# Patient Record
Sex: FEMALE | Race: WHITE | NOT HISPANIC OR LATINO | Employment: OTHER | URBAN - METROPOLITAN AREA
[De-identification: names, ages, dates, MRNs, and addresses within clinical notes are randomized per-mention and may not be internally consistent; named-entity substitution may affect disease eponyms.]

---

## 2020-12-22 ENCOUNTER — OFFICE VISIT (OUTPATIENT)
Dept: FAMILY MEDICINE | Facility: CLINIC | Age: 55
End: 2020-12-22
Payer: MEDICAID

## 2020-12-22 VITALS
OXYGEN SATURATION: 98 % | BODY MASS INDEX: 26.22 KG/M2 | DIASTOLIC BLOOD PRESSURE: 90 MMHG | TEMPERATURE: 99 F | WEIGHT: 148 LBS | SYSTOLIC BLOOD PRESSURE: 137 MMHG | RESPIRATION RATE: 16 BRPM | HEART RATE: 90 BPM | HEIGHT: 63 IN

## 2020-12-22 DIAGNOSIS — J44.9 CHRONIC OBSTRUCTIVE PULMONARY DISEASE, UNSPECIFIED COPD TYPE: ICD-10-CM

## 2020-12-22 DIAGNOSIS — I69.398 DEPRESSION DUE TO OLD STROKE: ICD-10-CM

## 2020-12-22 DIAGNOSIS — L50.8 CHRONIC URTICARIA: ICD-10-CM

## 2020-12-22 DIAGNOSIS — F06.31 DEPRESSION DUE TO OLD STROKE: ICD-10-CM

## 2020-12-22 DIAGNOSIS — Z71.6 ENCOUNTER FOR SMOKING CESSATION COUNSELING: ICD-10-CM

## 2020-12-22 DIAGNOSIS — I63.9 ISCHEMIC STROKE: Primary | ICD-10-CM

## 2020-12-22 DIAGNOSIS — R53.81 PHYSICAL DEBILITY: ICD-10-CM

## 2020-12-22 PROCEDURE — 99205 PR OFFICE/OUTPT VISIT, NEW, LEVL V, 60-74 MIN: ICD-10-PCS | Mod: S$GLB,,, | Performed by: NURSE PRACTITIONER

## 2020-12-22 PROCEDURE — 99205 OFFICE O/P NEW HI 60 MIN: CPT | Mod: S$GLB,,, | Performed by: NURSE PRACTITIONER

## 2020-12-22 RX ORDER — HYDROXYZINE HYDROCHLORIDE 50 MG/1
50 TABLET, FILM COATED ORAL 3 TIMES DAILY
COMMUNITY
End: 2020-12-22 | Stop reason: SDUPTHER

## 2020-12-22 RX ORDER — FAMOTIDINE 20 MG/1
20 TABLET, FILM COATED ORAL 2 TIMES DAILY
COMMUNITY
End: 2020-12-22 | Stop reason: SDUPTHER

## 2020-12-22 RX ORDER — HYDROXYZINE HYDROCHLORIDE 50 MG/1
50 TABLET, FILM COATED ORAL 3 TIMES DAILY PRN
Qty: 90 TABLET | Refills: 5 | Status: SHIPPED | OUTPATIENT
Start: 2020-12-22 | End: 2021-09-27 | Stop reason: SDUPTHER

## 2020-12-22 RX ORDER — ASPIRIN 81 MG/1
81 TABLET ORAL DAILY
COMMUNITY
End: 2022-05-18

## 2020-12-22 RX ORDER — ASPIRIN 300 MG/1
SUPPOSITORY RECTAL
COMMUNITY
End: 2022-05-18

## 2020-12-22 RX ORDER — ALBUTEROL SULFATE 90 UG/1
2 AEROSOL, METERED RESPIRATORY (INHALATION) EVERY 6 HOURS PRN
Qty: 18 G | Refills: 0 | Status: SHIPPED | OUTPATIENT
Start: 2020-12-22 | End: 2021-03-10 | Stop reason: SDUPTHER

## 2020-12-22 RX ORDER — BUPROPION HYDROCHLORIDE 150 MG/1
150 TABLET ORAL DAILY
Qty: 30 TABLET | Refills: 11 | Status: SHIPPED | OUTPATIENT
Start: 2020-12-22 | End: 2022-06-10 | Stop reason: DRUGHIGH

## 2020-12-22 RX ORDER — FAMOTIDINE 20 MG/1
20 TABLET, FILM COATED ORAL 2 TIMES DAILY
Qty: 60 TABLET | Refills: 3 | Status: SHIPPED | OUTPATIENT
Start: 2020-12-22 | End: 2021-11-12 | Stop reason: SDUPTHER

## 2020-12-22 RX ORDER — CLOPIDOGREL BISULFATE 75 MG/1
75 TABLET ORAL EVERY MORNING
COMMUNITY
Start: 2020-05-01

## 2020-12-22 RX ORDER — HYDROXYZINE 50 MG/ML
INJECTION, SOLUTION INTRAMUSCULAR
COMMUNITY
Start: 2020-06-01 | End: 2024-01-31

## 2020-12-22 NOTE — ASSESSMENT & PLAN NOTE
Will issue rescue inhaler.  She will eventually need pulmonary function studies.  Plan to do this at a later visit. For now smoking cessation.

## 2020-12-22 NOTE — ASSESSMENT & PLAN NOTE
Start Wellbutrin. Discussed side effects of meds.     Will trial. She will RTC in 2 weeks or sooner if symptoms worsen of AEs develop.

## 2020-12-22 NOTE — ASSESSMENT & PLAN NOTE
Currently on Plavix daily and ASA.     She has refused STATINs in the past. Advised CoQ10 OTC if she is unwilling to do statins. She states that statins caused one of her strokes post DC.... doubtful..       Will send to Neuro and PT.       She voiced understanding of risk mods.  We will start smoking cessation.     Advised BP reduction w/ lisinopril. She Refused BP med.

## 2020-12-22 NOTE — PROGRESS NOTES
"Subjective:      Patient ID: Amira Kruse is a 55 y.o. female.    Chief Complaint: Establish Care (Recently moved from [place]. )      55-year-old female       Current smoker      Past medical history of ischemic stroke, COPD, chronic urticaria, GERD, anxiety.       Here today to establish care.         Recently discharged from the hospital in [place] with ischemic stroke.  Patient states that she had a new extensive cardiovascular workup which was ultimately negative for cause.  She was told her stroke was due to her smoking.  She is not stop smoking this point.  She is requesting smoking cessation.  Smokes 6 cigarettes per day. RU and RLE limited 2/t CVA. + weakness. Walks with a cane. Fine motor function limited in the right side. Speech is affected as well. + slurring at times. Has issues with memory as well.  She was unable to do Physical/occupational therapy 2/t insurance. She is currently on Plavix and ASA.  She is not on blood pressure medication or statins stating that a statin caused her CVA after she was discharged.  She is requesting Neurology and PT in the area. She is able to ambulate with assistance of cane. No recent falls. She is left handed. She reports "occular ischemic strokes" x 6 with 8 total strokes.       Also noted to have depression as a result of her stroke. Anxiety controlled w/ TID attarax. Denies SI, HI, delusions, grandiosity, hallucinations.       No other issues reported at this time.     Past Medical History:   Diagnosis Date    Anxiety disorder     GERD (gastroesophageal reflux disease)     Hive     Ischemic stroke     x 8 total; 6 occular      Social History     Socioeconomic History    Marital status: Single     Spouse name: Not on file    Number of children: Not on file    Years of education: Not on file    Highest education level: Not on file   Occupational History    Not on file   Social Needs    Financial resource strain: Not on file    Food insecurity "     Worry: Not on file     Inability: Not on file    Transportation needs     Medical: Not on file     Non-medical: Not on file   Tobacco Use    Smoking status: Current Every Day Smoker     Packs/day: 0.50     Years: 40.00     Pack years: 20.00     Types: Cigarettes    Smokeless tobacco: Never Used   Substance and Sexual Activity    Alcohol use: Not Currently    Drug use: Never    Sexual activity: Not on file   Lifestyle    Physical activity     Days per week: Not on file     Minutes per session: Not on file    Stress: Not on file   Relationships    Social connections     Talks on phone: Not on file     Gets together: Not on file     Attends Yazidi service: Not on file     Active member of club or organization: Not on file     Attends meetings of clubs or organizations: Not on file     Relationship status: Not on file   Other Topics Concern    Not on file   Social History Narrative    Not on file      Family History   Problem Relation Age of Onset    Stroke Mother     Heart attack Father         triple bypass    Stroke Maternal Grandfather         ROS:   Review of Systems   Constitutional: Negative for appetite change, chills, diaphoresis and fever.   HENT: Negative for congestion, drooling, facial swelling, mouth sores, postnasal drip, rhinorrhea, sinus pain, sneezing, sore throat, tinnitus, trouble swallowing and voice change.    Eyes: Negative for photophobia and visual disturbance.   Respiratory: Negative for apnea, cough, choking, shortness of breath and wheezing.    Cardiovascular: Negative for chest pain, palpitations and leg swelling.   Gastrointestinal: Negative for abdominal pain, blood in stool, diarrhea, nausea and vomiting.   Endocrine: Negative for polydipsia, polyphagia and polyuria.   Genitourinary: Negative for difficulty urinating, dysuria, flank pain, frequency, hematuria and urgency.   Musculoskeletal: Negative for joint swelling and neck stiffness.   Skin: Negative for rash.    Neurological: Positive for speech difficulty and weakness (RUE, RLE). Negative for dizziness, tremors, seizures, syncope, facial asymmetry, light-headedness and numbness.   Hematological: Negative for adenopathy. Does not bruise/bleed easily.   Psychiatric/Behavioral: Positive for dysphoric mood. Negative for agitation, behavioral problems, confusion, decreased concentration, hallucinations, self-injury and suicidal ideas. The patient is nervous/anxious. The patient is not hyperactive.    All other systems reviewed and are negative.    Objective:   Physical Exam  Vitals signs and nursing note reviewed.   Constitutional:       General: She is awake. She is not in acute distress.     Appearance: Normal appearance. She is well-developed and overweight. She is not ill-appearing.   HENT:      Head: Normocephalic and atraumatic.      Nose: Nose normal.      Mouth/Throat:      Mouth: Mucous membranes are moist.   Eyes:      General: No visual field deficit or scleral icterus.     Pupils: Pupils are equal, round, and reactive to light.   Neck:      Musculoskeletal: Normal range of motion and neck supple.      Vascular: No carotid bruit.   Cardiovascular:      Rate and Rhythm: Normal rate and regular rhythm.      Pulses: Normal pulses.      Heart sounds: Normal heart sounds. No murmur.   Pulmonary:      Effort: Pulmonary effort is normal.      Breath sounds: Wheezing present.   Abdominal:      General: Bowel sounds are normal.      Palpations: Abdomen is soft.   Musculoskeletal: Normal range of motion.      Right lower leg: No edema.      Left lower leg: No edema.   Lymphadenopathy:      Cervical: No cervical adenopathy.   Skin:     General: Skin is warm and dry.      Capillary Refill: Capillary refill takes less than 2 seconds.      Coloration: Skin is not jaundiced.      Findings: No rash.   Neurological:      Mental Status: She is alert and oriented to person, place, and time.      Cranial Nerves: Dysarthria present. No  facial asymmetry.      Sensory: Sensation is intact.      Motor: Weakness (RUE, RLE) present. No tremor, abnormal muscle tone or seizure activity.      Coordination: Coordination abnormal.   Psychiatric:         Behavior: Behavior normal. Behavior is cooperative.         Thought Content: Thought content normal.         Judgment: Judgment normal.       Assessment:     1. Ischemic stroke    2. Physical debility    3. Encounter for smoking cessation counseling    4. Chronic urticaria    5. Depression due to old stroke    6. Chronic obstructive pulmonary disease, unspecified COPD type      No images are attached to the encounter.   Plan:     Problem List Items Addressed This Visit        Neuro    Ischemic stroke - Primary    Overview     x 8 total; 6 occular         Current Assessment & Plan     Currently on Plavix daily and ASA.     She has refused STATINs in the past. Advised CoQ10 OTC if she is unwilling to do statins. She states that statins caused one of her strokes post DC.... doubtful..       Will send to Neuro and PT.       She voiced understanding of risk mods.  We will start smoking cessation.     Advised BP reduction w/ lisinopril. She Refused BP med.          Relevant Orders    Ambulatory referral/consult to Neurology    Ambulatory referral/consult to Physical/Occupational Therapy    Depression due to old stroke    Current Assessment & Plan     Start Wellbutrin. Discussed side effects of meds.     Will trial. She will RTC in 2 weeks or sooner if symptoms worsen of AEs develop.          Relevant Medications    buPROPion (WELLBUTRIN XL) 150 MG TB24 tablet    hydrOXYzine (ATARAX) 50 MG tablet       Derm    Chronic urticaria    Relevant Medications    famotidine (PEPCID) 20 MG tablet    hydrOXYzine (ATARAX) 50 MG tablet       Pulmonary    Chronic obstructive pulmonary disease    Current Assessment & Plan     Will issue rescue inhaler.  She will eventually need pulmonary function studies.  Plan to do this at a  later visit. For now smoking cessation.          Relevant Medications    albuterol (PROAIR HFA) 90 mcg/actuation inhaler       Other    Physical debility    Current Assessment & Plan     Refer to PT for physical therapy.          Relevant Orders    Ambulatory referral/consult to Physical/Occupational Therapy      Other Visit Diagnoses     Encounter for smoking cessation counseling        Discussed AEs r/t Wellbutrin vs chantix vs gum/patch. She wishes to do Wellbutrin    Relevant Medications    buPROPion (WELLBUTRIN XL) 150 MG TB24 tablet        Medical records request sent to [place].  Patient requests to defer labs stating she recently had them done.       All diagnostic data (labs/imaging) was reviewed with the patient and/or family member in the room.  All questions were answered to their liking. The patient and/or family member voiced understanding of all instructions provided. Expectations regarding follow up and treatment plan were voiced and confirmed prior to departure. The patient was given orders/instructions at the end of the visit for reference.     Follow up:     There are no Patient Instructions on file for this visit.     Follow up in about 2 weeks (around 1/5/2021), or if symptoms worsen or fail to improve.

## 2020-12-28 ENCOUNTER — TELEPHONE (OUTPATIENT)
Dept: FAMILY MEDICINE | Facility: CLINIC | Age: 55
End: 2020-12-28

## 2020-12-28 NOTE — TELEPHONE ENCOUNTER
I have tried to help her prevent recurrence. I advised blood pressure control which she refused to take. I advised all preventative measures. IF she would like to come in then ok. Otherwise she will need to see Neuro. If it happens again she needs to go to ER for further evaluation. We do not have her records so I can not weigh in on it more than I have in the prior encounter.  We thoroughly discussed the risks associated with not following recommendations. Im fine with whatever she wishes to pursue.

## 2020-12-28 NOTE — TELEPHONE ENCOUNTER
----- Message from Anyi Alan sent at 12/28/2020  9:52 AM CST -----  Patient need to speak to nurse regarding feeling disoriented and words not making any sense.  Call back number [phone]. Tks

## 2020-12-28 NOTE — TELEPHONE ENCOUNTER
That's not in our deal and im not going to agree to her not seeing a Neurologist. She can come in tomorrow and I will have this convo in person.

## 2020-12-28 NOTE — TELEPHONE ENCOUNTER
Pt will come in tomorrow. Also no local neurologists takes [employer]. I advised her to find an in [facility] neuro, but she now wants to just start Bp rx.

## 2020-12-28 NOTE — TELEPHONE ENCOUNTER
States one side of face was hanging and she was talking out of her head on Miguel Ángel jaclyn. It has all subsided, but should she come in or anything?

## 2021-01-05 ENCOUNTER — OFFICE VISIT (OUTPATIENT)
Dept: FAMILY MEDICINE | Facility: CLINIC | Age: 56
End: 2021-01-05
Payer: MEDICAID

## 2021-01-05 VITALS
HEART RATE: 90 BPM | SYSTOLIC BLOOD PRESSURE: 139 MMHG | RESPIRATION RATE: 14 BRPM | OXYGEN SATURATION: 99 % | DIASTOLIC BLOOD PRESSURE: 80 MMHG | TEMPERATURE: 98 F

## 2021-01-05 DIAGNOSIS — G45.9 TIA (TRANSIENT ISCHEMIC ATTACK): Primary | ICD-10-CM

## 2021-01-05 DIAGNOSIS — I69.398 DEPRESSION DUE TO OLD STROKE: ICD-10-CM

## 2021-01-05 DIAGNOSIS — F06.31 DEPRESSION DUE TO OLD STROKE: ICD-10-CM

## 2021-01-05 PROCEDURE — 99213 PR OFFICE/OUTPT VISIT, EST, LEVL III, 20-29 MIN: ICD-10-PCS | Mod: S$GLB,,, | Performed by: NURSE PRACTITIONER

## 2021-01-05 PROCEDURE — 99213 OFFICE O/P EST LOW 20 MIN: CPT | Mod: S$GLB,,, | Performed by: NURSE PRACTITIONER

## 2021-01-05 RX ORDER — LISINOPRIL 5 MG/1
5 TABLET ORAL DAILY
Qty: 90 TABLET | Refills: 3 | Status: SHIPPED | OUTPATIENT
Start: 2021-01-05 | End: 2022-01-05

## 2021-03-08 ENCOUNTER — PATIENT MESSAGE (OUTPATIENT)
Dept: ADMINISTRATIVE | Facility: HOSPITAL | Age: 56
End: 2021-03-08

## 2021-03-10 ENCOUNTER — OFFICE VISIT (OUTPATIENT)
Dept: FAMILY MEDICINE | Facility: CLINIC | Age: 56
End: 2021-03-10
Payer: MEDICAID

## 2021-03-10 VITALS
RESPIRATION RATE: 16 BRPM | OXYGEN SATURATION: 98 % | WEIGHT: 148 LBS | HEART RATE: 71 BPM | HEIGHT: 63 IN | BODY MASS INDEX: 26.22 KG/M2

## 2021-03-10 DIAGNOSIS — R09.89 LUNG CRACKLES: ICD-10-CM

## 2021-03-10 DIAGNOSIS — J44.9 CHRONIC OBSTRUCTIVE PULMONARY DISEASE, UNSPECIFIED COPD TYPE: ICD-10-CM

## 2021-03-10 DIAGNOSIS — R05.9 COUGH: Primary | ICD-10-CM

## 2021-03-10 DIAGNOSIS — S20.212A RIB CONTUSION, LEFT, INITIAL ENCOUNTER: ICD-10-CM

## 2021-03-10 DIAGNOSIS — R07.89 LEFT-SIDED CHEST WALL PAIN: ICD-10-CM

## 2021-03-10 PROCEDURE — 99214 PR OFFICE/OUTPT VISIT, EST, LEVL IV, 30-39 MIN: ICD-10-PCS | Mod: S$GLB,,, | Performed by: NURSE PRACTITIONER

## 2021-03-10 PROCEDURE — 99214 OFFICE O/P EST MOD 30 MIN: CPT | Mod: S$GLB,,, | Performed by: NURSE PRACTITIONER

## 2021-03-10 RX ORDER — BENZONATATE 200 MG/1
200 CAPSULE ORAL 3 TIMES DAILY PRN
Qty: 30 CAPSULE | Refills: 0 | Status: SHIPPED | OUTPATIENT
Start: 2021-03-10 | End: 2021-03-20

## 2021-03-10 RX ORDER — DOXYCYCLINE HYCLATE 100 MG
100 TABLET ORAL 2 TIMES DAILY
Qty: 14 TABLET | Refills: 0 | Status: SHIPPED | OUTPATIENT
Start: 2021-03-10 | End: 2021-03-17

## 2021-03-10 RX ORDER — ALBUTEROL SULFATE 90 UG/1
2 AEROSOL, METERED RESPIRATORY (INHALATION) EVERY 6 HOURS PRN
Qty: 18 G | Refills: 3 | Status: SHIPPED | OUTPATIENT
Start: 2021-03-10 | End: 2022-05-04 | Stop reason: SDUPTHER

## 2021-03-10 RX ORDER — PREDNISONE 20 MG/1
20 TABLET ORAL 2 TIMES DAILY
Qty: 10 TABLET | Refills: 0 | Status: SHIPPED | OUTPATIENT
Start: 2021-03-10 | End: 2021-03-15

## 2021-03-15 ENCOUNTER — OFFICE VISIT (OUTPATIENT)
Dept: FAMILY MEDICINE | Facility: CLINIC | Age: 56
End: 2021-03-15
Payer: MEDICAID

## 2021-03-15 VITALS — DIASTOLIC BLOOD PRESSURE: 80 MMHG | TEMPERATURE: 98 F | RESPIRATION RATE: 16 BRPM | SYSTOLIC BLOOD PRESSURE: 136 MMHG

## 2021-03-15 DIAGNOSIS — R05.9 COUGH: ICD-10-CM

## 2021-03-15 DIAGNOSIS — J84.9 INTERSTITIAL LUNG DISORDERS: Primary | ICD-10-CM

## 2021-03-15 PROCEDURE — 99213 PR OFFICE/OUTPT VISIT, EST, LEVL III, 20-29 MIN: ICD-10-PCS | Mod: S$GLB,,, | Performed by: NURSE PRACTITIONER

## 2021-03-15 PROCEDURE — 99213 OFFICE O/P EST LOW 20 MIN: CPT | Mod: S$GLB,,, | Performed by: NURSE PRACTITIONER

## 2021-03-23 DIAGNOSIS — J44.9 CHRONIC OBSTRUCTIVE PULMONARY DISEASE, UNSPECIFIED COPD TYPE: Primary | ICD-10-CM

## 2021-04-20 ENCOUNTER — PATIENT OUTREACH (OUTPATIENT)
Dept: ADMINISTRATIVE | Facility: HOSPITAL | Age: 56
End: 2021-04-20

## 2021-09-20 ENCOUNTER — TELEPHONE (OUTPATIENT)
Dept: FAMILY MEDICINE | Facility: CLINIC | Age: 56
End: 2021-09-20

## 2021-09-22 ENCOUNTER — TELEPHONE (OUTPATIENT)
Dept: FAMILY MEDICINE | Facility: CLINIC | Age: 56
End: 2021-09-22

## 2021-09-22 DIAGNOSIS — L50.8 CHRONIC URTICARIA: ICD-10-CM

## 2021-09-22 DIAGNOSIS — S32.029A CLOSED FRACTURE OF SECOND LUMBAR VERTEBRA, UNSPECIFIED FRACTURE MORPHOLOGY, INITIAL ENCOUNTER: Primary | ICD-10-CM

## 2021-09-22 DIAGNOSIS — I69.398 DEPRESSION DUE TO OLD STROKE: ICD-10-CM

## 2021-09-22 DIAGNOSIS — F06.31 DEPRESSION DUE TO OLD STROKE: ICD-10-CM

## 2021-09-27 RX ORDER — IBUPROFEN 800 MG/1
800 TABLET ORAL 3 TIMES DAILY PRN
Qty: 90 TABLET | Refills: 1 | Status: SHIPPED | OUTPATIENT
Start: 2021-09-27 | End: 2022-05-18

## 2021-09-27 RX ORDER — HYDROXYZINE HYDROCHLORIDE 50 MG/1
50 TABLET, FILM COATED ORAL 3 TIMES DAILY PRN
Qty: 90 TABLET | Refills: 5 | Status: SHIPPED | OUTPATIENT
Start: 2021-09-27 | End: 2024-01-31

## 2021-11-12 DIAGNOSIS — L50.8 CHRONIC URTICARIA: ICD-10-CM

## 2021-11-12 RX ORDER — FAMOTIDINE 20 MG/1
20 TABLET, FILM COATED ORAL 2 TIMES DAILY
Qty: 60 TABLET | Refills: 3 | Status: SHIPPED | OUTPATIENT
Start: 2021-11-12 | End: 2022-05-18

## 2021-11-24 ENCOUNTER — TELEPHONE (OUTPATIENT)
Dept: FAMILY MEDICINE | Facility: CLINIC | Age: 56
End: 2021-11-24
Payer: MEDICAID

## 2021-11-24 DIAGNOSIS — J44.9 CHRONIC OBSTRUCTIVE PULMONARY DISEASE, UNSPECIFIED COPD TYPE: Primary | ICD-10-CM

## 2021-11-24 RX ORDER — FLUTICASONE PROPIONATE AND SALMETEROL XINAFOATE 115; 21 UG/1; UG/1
2 AEROSOL, METERED RESPIRATORY (INHALATION) EVERY 12 HOURS
Qty: 12 G | Refills: 0 | Status: SHIPPED | OUTPATIENT
Start: 2021-11-24 | End: 2022-05-04 | Stop reason: SDUPTHER

## 2022-01-24 ENCOUNTER — TELEPHONE (OUTPATIENT)
Dept: FAMILY MEDICINE | Facility: CLINIC | Age: 57
End: 2022-01-24
Payer: MEDICAID

## 2022-01-24 RX ORDER — PREDNISONE 20 MG/1
20 TABLET ORAL 2 TIMES DAILY
Qty: 10 TABLET | Refills: 0 | Status: SHIPPED | OUTPATIENT
Start: 2022-01-24 | End: 2022-01-29

## 2022-01-24 NOTE — TELEPHONE ENCOUNTER
----- Message from Lexuskyle James sent at 1/24/2022 11:58 AM CST -----  Regarding: pt  Pt would like to speak to the nurse in regards to getting medication for breathing. Pt said she spoke to GUERLINE Velez about it.  Please call back [phone]

## 2022-01-24 NOTE — TELEPHONE ENCOUNTER
"I will send in prednisone very short course... she needs to go see someone there for her breathing bc this is not appropriate     "She needs to establish care with a doctor there. I will send in a basic inhaler, but she didn't do the PFT I ordered from her last encounter. Its her responsibility to get another doctor."  "

## 2022-06-01 ENCOUNTER — TELEPHONE (OUTPATIENT)
Dept: SMOKING CESSATION | Facility: CLINIC | Age: 57
End: 2022-06-01
Payer: MEDICAID

## 2022-06-10 ENCOUNTER — CLINICAL SUPPORT (OUTPATIENT)
Dept: SMOKING CESSATION | Facility: CLINIC | Age: 57
End: 2022-06-10
Payer: COMMERCIAL

## 2022-06-10 ENCOUNTER — TELEPHONE (OUTPATIENT)
Dept: SMOKING CESSATION | Facility: CLINIC | Age: 57
End: 2022-06-10
Payer: MEDICAID

## 2022-06-10 DIAGNOSIS — F17.200 NICOTINE DEPENDENCE: Primary | ICD-10-CM

## 2022-06-10 PROCEDURE — 99404 PREV MED CNSL INDIV APPRX 60: CPT | Mod: S$GLB,,,

## 2022-06-10 PROCEDURE — 99404 PR PREVENT COUNSEL,INDIV,60 MIN: ICD-10-PCS | Mod: S$GLB,,,

## 2022-06-10 RX ORDER — IBUPROFEN 200 MG
1 TABLET ORAL DAILY
Qty: 14 PATCH | Refills: 0 | Status: SHIPPED | OUTPATIENT
Start: 2022-06-10 | End: 2022-07-08 | Stop reason: SDUPTHER

## 2022-06-10 RX ORDER — BUPROPION HYDROCHLORIDE 150 MG/1
150 TABLET, EXTENDED RELEASE ORAL 2 TIMES DAILY
Qty: 60 TABLET | Refills: 11 | Status: SHIPPED | OUTPATIENT
Start: 2022-06-10 | End: 2024-01-31

## 2022-06-10 NOTE — PROGRESS NOTES
Pt smokes 5-15 cpd and drinks alcohol.  She has had multiple strokes and has COPD.  She is concerned for her health and want to stop smoking.  Pt will start Wellbutrin and use the 21mg patch. She smokes in her home and will try to smoke only outside.

## 2022-06-14 ENCOUNTER — TELEPHONE (OUTPATIENT)
Dept: SMOKING CESSATION | Facility: CLINIC | Age: 57
End: 2022-06-14
Payer: MEDICAID

## 2022-06-14 NOTE — TELEPHONE ENCOUNTER
Tried to call patient about moving appt on 17th to another day due to a meeting I have, I was unable to leave a message.

## 2022-06-16 ENCOUNTER — TELEPHONE (OUTPATIENT)
Dept: SMOKING CESSATION | Facility: CLINIC | Age: 57
End: 2022-06-16
Payer: MEDICAID

## 2022-06-17 ENCOUNTER — CLINICAL SUPPORT (OUTPATIENT)
Dept: SMOKING CESSATION | Facility: CLINIC | Age: 57
End: 2022-06-17
Payer: COMMERCIAL

## 2022-06-17 DIAGNOSIS — F17.200 NICOTINE DEPENDENCE: Primary | ICD-10-CM

## 2022-06-17 PROCEDURE — 99402 PREV MED CNSL INDIV APPRX 30: CPT | Mod: S$GLB,,,

## 2022-06-17 PROCEDURE — 99402 PR PREVENT COUNSEL,INDIV,30 MIN: ICD-10-PCS | Mod: S$GLB,,,

## 2022-06-17 RX ORDER — DM/P-EPHED/ACETAMINOPH/DOXYLAM 30-7.5/3
2 LIQUID (ML) ORAL
Qty: 168 LOZENGE | Refills: 0 | Status: SHIPPED | OUTPATIENT
Start: 2022-06-17 | End: 2024-01-08 | Stop reason: SDUPTHER

## 2022-06-17 NOTE — PROGRESS NOTES
Individual Follow-Up Form    6/17/2022    Quit Date: TBD    Clinical Status of Patient: Outpatient    Length of Service: 30 minutes    Continuing Medication: yes  Patches    Other Medications: none     Target Symptoms: Withdrawal and medication side effects. The following were  rated moderate (3) to severe (4) on TCRS:  · Moderate (3):   · Severe (4):     Comments: Pt reports smoking less and even being around smokers not having the urge to smoke. She wears the patch all night sometimes. She is happy with her progress and excited. Will order lozenges today to help her not smoke in her home.     The patient will continue with  therapy sessions and medication monitoring by CTTS. Prescribed medication management will be by physician.  Reviewed strategies, cues, and triggers. Introduced the negative impact of tobacco on health, the health advantages of discontinuing the use of tobacco, time line improved health changes after a quit, withdrawal issues to expect from nicotine and habit, and ways to achieve the goal of a quit.  The patient denies any abnormal behavioral or mental changes at this time.     Follow up in 1 week.  Diagnosis: F17.200    Next Visit: 1 week

## 2022-06-24 ENCOUNTER — CLINICAL SUPPORT (OUTPATIENT)
Dept: SMOKING CESSATION | Facility: CLINIC | Age: 57
End: 2022-06-24
Payer: COMMERCIAL

## 2022-06-24 DIAGNOSIS — F17.200 NICOTINE DEPENDENCE: ICD-10-CM

## 2022-06-24 PROCEDURE — 99402 PR PREVENT COUNSEL,INDIV,30 MIN: ICD-10-PCS | Mod: S$GLB,,,

## 2022-06-24 PROCEDURE — 99402 PREV MED CNSL INDIV APPRX 30: CPT | Mod: S$GLB,,,

## 2022-06-24 NOTE — PROGRESS NOTES
Individual Follow-Up Form    6/24/2022    Quit Date: TBD    Clinical Status of Patient: Outpatient    Length of Service: 30 minutes    Continuing Medication: yes  Patches    Other Medications: none     Target Symptoms: Withdrawal and medication side effects. The following were  rated moderate (3) to severe (4) on TCRS:  · Moderate (3):   · Severe (4):     Comments: Pt is down to 6-8 cpd from 10 cpd; she is using patches, but pharmacy gave her wrong rx of gum and not lozenges.  She will get them switched out today. She is trying to go outside more to smoke and she hasn't drank alcohol in 1 week and 4 days.  She is happy about progress.   The patient will continue with  therapy sessions and medication monitoring by CTTS. Prescribed medication management will be by physician.    Reviewed strategies, cues, and triggers. Introduced the negative impact of tobacco on health, the health advantages of discontinuing the use of tobacco, time line improved health changes after a quit, withdrawal issues to expect from nicotine and habit, and ways to achieve the goal of a quit.  The patient denies any abnormal behavioral or mental changes at this time. \    Follow up in 1 week.  Diagnosis: F17.200    Next Visit: 1 week

## 2022-07-08 ENCOUNTER — CLINICAL SUPPORT (OUTPATIENT)
Dept: SMOKING CESSATION | Facility: CLINIC | Age: 57
End: 2022-07-08
Payer: COMMERCIAL

## 2022-07-08 ENCOUNTER — TELEPHONE (OUTPATIENT)
Dept: SMOKING CESSATION | Facility: CLINIC | Age: 57
End: 2022-07-08
Payer: MEDICAID

## 2022-07-08 DIAGNOSIS — F17.200 NICOTINE DEPENDENCE: Primary | ICD-10-CM

## 2022-07-08 RX ORDER — IBUPROFEN 200 MG
1 TABLET ORAL DAILY
Qty: 14 PATCH | Refills: 0 | Status: SHIPPED | OUTPATIENT
Start: 2022-07-08 | End: 2022-09-02 | Stop reason: SDUPTHER

## 2022-07-08 NOTE — PROGRESS NOTES
Individual Follow-Up Form    7/8/2022    Quit Date: TBD    Clinical Status of Patient: Outpatient    Length of Service: 30 minutes    Continuing Medication: yes  Wellbutrin, Patches, Nicotine gum or Nicotine Lozenges    Other Medications: none     Target Symptoms: Withdrawal and medication side effects. The following were  rated moderate (3) to severe (4) on TCRS:  · Moderate (3):   · Severe (4):     Comments: Pt is  6 cpd but stopped wearing patches. We discussed her reasons why and helped her sort through some emotions that were affecting her application of patches. She hasn't tried the lozenges yet but will today. She stated that this session will help her get back on track.    The patient will continue with  therapy sessions and medication monitoring by CTTS. Prescribed medication management will be by physician.  Reviewed strategies, controlling environment, cues, triggers, new goals set. Introduced high risk situations with preparation interventions, caffeine similarities with withdrawal issues of habit and nicotine, Alcohol, Understanding urges, cravings, stress and relaxation. Open discussion with intervention discussion.  The patient denies any abnormal behavioral or mental changes at this time.     Follow up in 2 weeks  Diagnosis: F17.200    Next Visit: 2 weeks

## 2022-07-28 ENCOUNTER — TELEPHONE (OUTPATIENT)
Dept: SMOKING CESSATION | Facility: CLINIC | Age: 57
End: 2022-07-28
Payer: MEDICAID

## 2022-08-01 ENCOUNTER — CLINICAL SUPPORT (OUTPATIENT)
Dept: SMOKING CESSATION | Facility: CLINIC | Age: 57
End: 2022-08-01
Payer: COMMERCIAL

## 2022-08-01 DIAGNOSIS — F17.200 NICOTINE DEPENDENCE: ICD-10-CM

## 2022-08-01 PROCEDURE — 99402 PR PREVENT COUNSEL,INDIV,30 MIN: ICD-10-PCS | Mod: S$GLB,,,

## 2022-08-01 PROCEDURE — 99402 PREV MED CNSL INDIV APPRX 30: CPT | Mod: S$GLB,,,

## 2022-08-01 NOTE — PROGRESS NOTES
Individual Follow-Up Form    8/1/2022    Quit Date: TBD    Clinical Status of Patient: Outpatient    Length of Service: 30 minutes    Continuing Medication: yes  Patches or Nicotine Lozenges    Other Medications: none     Target Symptoms: Withdrawal and medication side effects. The following were  rated moderate (3) to severe (4) on TCRS:  · Moderate (3):   · Severe (4):     Comments: Pt is smoking 5 cpd but only able to take 1-2 puffs and then re-lights.  She does not wear the patches like she is directed too and claims that cigarettes are her best friend. We discussed this. She will start using the lozenges more often t o help control urges.    Follow up in 1 week    Diagnosis: F17.200    Next Visit: 1 week

## 2022-08-08 ENCOUNTER — CLINICAL SUPPORT (OUTPATIENT)
Dept: SMOKING CESSATION | Facility: CLINIC | Age: 57
End: 2022-08-08
Payer: COMMERCIAL

## 2022-08-08 DIAGNOSIS — F17.200 NICOTINE DEPENDENCE: ICD-10-CM

## 2022-08-08 PROCEDURE — 99403 PREV MED CNSL INDIV APPRX 45: CPT | Mod: S$GLB,,,

## 2022-08-08 PROCEDURE — 99403 PR PREVENT COUNSEL,INDIV,45 MIN: ICD-10-PCS | Mod: S$GLB,,,

## 2022-08-08 NOTE — PROGRESS NOTES
Individual Follow-Up Form    8/8/2022    Quit Date: TBD    Clinical Status of Patient: Outpatient    Length of Service: 30 minutes    Continuing Medication: yes  Patches or Nicotine Lozenges    Other Medications: none     Target Symptoms: Withdrawal and medication side effects. The following were  rated moderate (3) to severe (4) on TCRS:  · Moderate (3):   · Severe (4):     Comments: Pt is down to 4-5 cpd and has been wearingthe patches daily and admits she needs to work on using her lozenges more each day.  She is trying to get help with cuitting down on drinking because it reflects also in her smoking amount.  Seems that her mind has shifted in lots of ways to make her healthier and she is going to start walking too.    The patient will continue with  therapy sessions and medication monitoring by CTTS. Prescribed medication management will be by physician.  Reviewed strategies, habitual behavior, high risks situations, understanding urges and cravings, stress and relaxation with open discussion and additional interventions, Introduced lapses, relapses, understanding them and analyzing the situation of a lapse, conflict issues that may be linked to a lapse.   The patient denies any abnormal behavioral or mental changes at this time.     Follow up in 1 week.    Diagnosis: F17.200    Next Visit: 1 week

## 2022-08-26 ENCOUNTER — CLINICAL SUPPORT (OUTPATIENT)
Dept: SMOKING CESSATION | Facility: CLINIC | Age: 57
End: 2022-08-26
Payer: COMMERCIAL

## 2022-08-26 DIAGNOSIS — F17.200 NICOTINE DEPENDENCE: ICD-10-CM

## 2022-08-26 PROCEDURE — 99402 PREV MED CNSL INDIV APPRX 30: CPT | Mod: S$GLB,,,

## 2022-08-26 PROCEDURE — 99402 PR PREVENT COUNSEL,INDIV,30 MIN: ICD-10-PCS | Mod: S$GLB,,,

## 2022-08-26 NOTE — PROGRESS NOTES
Individual Follow-Up Form    8/26/2022    Quit Date: 8/23/22    Clinical Status of Patient: Outpatient    Length of Service: 30 minutes    Continuing Medication: yes  Wellbutrin, Patches, Nicotine gum or Nicotine Lozenges    Other Medications: none     Target Symptoms: Withdrawal and medication side effects. The following were  rated moderate (3) to severe (4) on TCRS:  · Moderate (3): none  · Severe (4): none    Comments: Pt states that she has been smoke free since Tuesday, 23rd and alcohol free since 21st. She is overly happy about both successes and states that she has never been able to quit unless she was hospitalized or incarcerated. She is very appreciative of NRT support and someone to talk to with counseling her addiction. I advised her to buy a calendar or use paper to chart her daily non-smoker status, she loved that idea saying that she is a visual learner and needed that.     The patient will continue with  therapy sessions and medication monitoring by CTTS. Prescribed medication management will be by physician.    Completion of TCRS (Tobacco Cessation Rating Scale) reviewed strategies, cues, triggers, high risk situations, lapses, relapses, diet, exercise, stress, relaxation, sleep, habitual behavior, and life style changes.  The patient denies any abnormal behavioral or mental changes at this time.     Diagnosis: F17.200    Next Visit: 1 week

## 2022-09-02 ENCOUNTER — CLINICAL SUPPORT (OUTPATIENT)
Dept: SMOKING CESSATION | Facility: CLINIC | Age: 57
End: 2022-09-02
Payer: COMMERCIAL

## 2022-09-02 DIAGNOSIS — F17.200 NICOTINE DEPENDENCE: ICD-10-CM

## 2022-09-02 PROCEDURE — 99402 PR PREVENT COUNSEL,INDIV,30 MIN: ICD-10-PCS | Mod: S$GLB,,,

## 2022-09-02 PROCEDURE — 99402 PREV MED CNSL INDIV APPRX 30: CPT | Mod: S$GLB,,,

## 2022-09-02 RX ORDER — IBUPROFEN 200 MG
1 TABLET ORAL DAILY
Qty: 14 PATCH | Refills: 0 | Status: SHIPPED | OUTPATIENT
Start: 2022-09-02 | End: 2022-12-06 | Stop reason: SDUPTHER

## 2022-09-02 NOTE — PROGRESS NOTES
Individual Follow-Up Form    9/2/2022    Quit Date: 8/23/22    Clinical Status of Patient: Outpatient    Length of Service: 30 minutes    Continuing Medication: yes  Wellbutrin, Patches, or Nicotine gum    Other Medications: none     Target Symptoms: Withdrawal and medication side effects. The following were  rated moderate (3) to severe (4) on TCRS:  Moderate (3): none  Severe (4): none    Comments: Pt is still smoke free and is also alcohol free. She is using the NRT as prescribed and feeling great.  She has noticed she isn't out of breath when she washed her hair.  She hasn't had to use her inhalers since quitting smoking. She is using all the tools we talk about to stay smoke free and using some to help her stay alcohol free too.  She likes the coloring book also.    The patient will continue with  therapy sessions and medication monitoring by CTTS. Prescribed medication management will be by physician.  The patient denies any abnormal behavioral or mental changes at this time.     Diagnosis: F17.200    Next Visit: 1 week

## 2023-01-25 ENCOUNTER — TELEPHONE (OUTPATIENT)
Dept: SMOKING CESSATION | Facility: CLINIC | Age: 58
End: 2023-01-25
Payer: COMMERCIAL

## 2023-06-14 ENCOUNTER — TELEPHONE (OUTPATIENT)
Dept: SMOKING CESSATION | Facility: CLINIC | Age: 58
End: 2023-06-14
Payer: COMMERCIAL

## 2024-01-05 ENCOUNTER — TELEPHONE (OUTPATIENT)
Dept: SMOKING CESSATION | Facility: CLINIC | Age: 59
End: 2024-01-05

## 2024-01-08 ENCOUNTER — CLINICAL SUPPORT (OUTPATIENT)
Dept: SMOKING CESSATION | Facility: CLINIC | Age: 59
End: 2024-01-08
Payer: COMMERCIAL

## 2024-01-08 DIAGNOSIS — F17.200 NICOTINE DEPENDENCE: ICD-10-CM

## 2024-01-08 PROCEDURE — 99404 PREV MED CNSL INDIV APPRX 60: CPT | Mod: 95,,,

## 2024-01-08 RX ORDER — DM/P-EPHED/ACETAMINOPH/DOXYLAM 30-7.5/3
2 LIQUID (ML) ORAL
Refills: 0 | Status: CANCELLED | OUTPATIENT
Start: 2024-01-08

## 2024-01-08 RX ORDER — MICONAZOLE NITRATE 2 %
2 CREAM (GRAM) TOPICAL
Refills: 0 | Status: CANCELLED | OUTPATIENT
Start: 2024-01-08

## 2024-01-08 RX ORDER — DM/P-EPHED/ACETAMINOPH/DOXYLAM 30-7.5/3
2 LIQUID (ML) ORAL
Qty: 168 LOZENGE | Refills: 0 | Status: SHIPPED | OUTPATIENT
Start: 2024-01-08 | End: 2024-01-11 | Stop reason: SDUPTHER

## 2024-01-08 RX ORDER — IBUPROFEN 200 MG
1 TABLET ORAL DAILY
Qty: 14 PATCH | Refills: 0 | Status: SHIPPED | OUTPATIENT
Start: 2024-01-08 | End: 2024-01-11

## 2024-01-08 RX ORDER — IBUPROFEN 200 MG
1 TABLET ORAL DAILY
Qty: 14 PATCH | Refills: 0 | Status: CANCELLED | OUTPATIENT
Start: 2024-01-08

## 2024-01-08 RX ORDER — MICONAZOLE NITRATE 2 %
2 CREAM (GRAM) TOPICAL
Qty: 100 EACH | Refills: 0 | Status: SHIPPED | OUTPATIENT
Start: 2024-01-08 | End: 2024-01-11 | Stop reason: SDUPTHER

## 2024-01-08 NOTE — PROGRESS NOTES
Pt smokes 1 cpd and is trying to quit so she can breathe.  She has COPD and had 10 strokes due to smoking.  She will use patches, johan and gum.

## 2024-01-11 DIAGNOSIS — F17.200 NICOTINE DEPENDENCE: ICD-10-CM

## 2024-01-11 RX ORDER — IBUPROFEN 200 MG
1 TABLET ORAL DAILY
Qty: 14 PATCH | Refills: 0 | Status: SHIPPED | OUTPATIENT
Start: 2024-01-11 | End: 2024-02-09 | Stop reason: SDUPTHER

## 2024-01-11 RX ORDER — DM/P-EPHED/ACETAMINOPH/DOXYLAM 30-7.5/3
2 LIQUID (ML) ORAL
Qty: 168 LOZENGE | Refills: 0 | Status: SHIPPED | OUTPATIENT
Start: 2024-01-11

## 2024-01-11 RX ORDER — MICONAZOLE NITRATE 2 %
2 CREAM (GRAM) TOPICAL
Qty: 100 EACH | Refills: 0 | Status: SHIPPED | OUTPATIENT
Start: 2024-01-11 | End: 2024-02-09 | Stop reason: SDUPTHER

## 2024-01-26 ENCOUNTER — CLINICAL SUPPORT (OUTPATIENT)
Dept: SMOKING CESSATION | Facility: CLINIC | Age: 59
End: 2024-01-26
Payer: COMMERCIAL

## 2024-01-26 DIAGNOSIS — F17.200 NICOTINE DEPENDENCE: Primary | ICD-10-CM

## 2024-01-26 PROCEDURE — 99403 PREV MED CNSL INDIV APPRX 45: CPT | Mod: 95,,,

## 2024-01-26 NOTE — PROGRESS NOTES
Individual Follow-Up Form    1/26/2024    Quit Date: TBD    Clinical Status of Patient: Outpatient    Length of Service: 45 minutes    Continuing Medication: yes  Patches or Nicotine gum    Other Medications: none     Target Symptoms: Withdrawal and medication side effects. The following were  rated moderate (3) to severe (4) on TCRS:  Moderate (3): none  Severe (4): none    Comments: Pt is smoke free for past 4 days and trying to not smoke.  She has suffered with COPD exacerbation recently that scared her so she is thinking about that to keep her smoke free.  She is hopeful about not buying more cigarettes. Extensive discussion on health and her future.  The patient will continue with  therapy sessions and medication monitoring by CTTS. Prescribed medication management will be by physician.  Completion of TCRS (Tobacco Cessation Rating Scale) reviewed strategies, cues, and triggers. Introduced the negative impact of tobacco on health, the health advantages of discontinuing the use of tobacco, time line improved health changes after a quit, withdrawal issues to expect from nicotine and habit, and ways to achieve the goal of a quit.  The patient denies any abnormal behavioral or mental changes at this time.       Diagnosis: F17.200    Next Visit: 2 weeks

## 2024-02-08 PROBLEM — Z71.6 TOBACCO ABUSE COUNSELING: Status: ACTIVE | Noted: 2024-02-08

## 2024-02-08 PROBLEM — Z87.891 PERSONAL HISTORY OF TOBACCO USE, PRESENTING HAZARDS TO HEALTH: Status: ACTIVE | Noted: 2024-02-08

## 2024-02-09 ENCOUNTER — CLINICAL SUPPORT (OUTPATIENT)
Dept: SMOKING CESSATION | Facility: CLINIC | Age: 59
End: 2024-02-09
Payer: COMMERCIAL

## 2024-02-09 DIAGNOSIS — F17.200 NICOTINE DEPENDENCE: Primary | ICD-10-CM

## 2024-02-09 DIAGNOSIS — F17.200 NICOTINE DEPENDENCE: ICD-10-CM

## 2024-02-09 PROCEDURE — 99403 PREV MED CNSL INDIV APPRX 45: CPT | Mod: S$GLB,,,

## 2024-02-09 RX ORDER — IBUPROFEN 200 MG
1 TABLET ORAL DAILY
Qty: 14 PATCH | Refills: 0 | Status: SHIPPED | OUTPATIENT
Start: 2024-02-09 | End: 2024-02-27 | Stop reason: SDUPTHER

## 2024-02-09 RX ORDER — MICONAZOLE NITRATE 2 %
2 CREAM (GRAM) TOPICAL
Qty: 100 EACH | Refills: 0 | Status: SHIPPED | OUTPATIENT
Start: 2024-02-09 | End: 2024-02-27 | Stop reason: SDUPTHER

## 2024-02-09 NOTE — PROGRESS NOTES
Individual Follow-Up Form    2/9/2024    Quit Date: TBD    Clinical Status of Patient: Outpatient    Length of Service: 45 minutes    Continuing Medication: yes  Patches, Nicotine gum, or Nicotine Lozenges    Other Medications: none     Target Symptoms: Withdrawal and medication side effects. The following were  rated moderate (3) to severe (4) on TCRS:  Moderate (3): none  Severe (4): none    Comments: Pt has been smoke free for a few days and very hopeful about not buying cigarettes.  We talked in length about giving her the life she deserves.  She is optimistic about her future.  The patient will continue with  therapy sessions and medication monitoring by CTTS. Prescribed medication management will be by physician.  completion of TCRS (Tobacco Cessation Rating Scale) reviewed strategies, controlling environment, cues, triggers, new goals set. Introduced high risk situations with preparation interventions, caffeine similarities with withdrawal issues of habit and nicotine, Alcohol, Understanding urges, cravings, stress and relaxation. Open discussion with intervention discussion.  The patient denies any abnormal behavioral or mental changes at this time.       Diagnosis: F17.200    Next Visit: 2 weeks

## 2024-02-27 ENCOUNTER — CLINICAL SUPPORT (OUTPATIENT)
Dept: SMOKING CESSATION | Facility: CLINIC | Age: 59
End: 2024-02-27
Payer: COMMERCIAL

## 2024-02-27 DIAGNOSIS — F17.200 NICOTINE DEPENDENCE: ICD-10-CM

## 2024-02-27 PROCEDURE — 99403 PREV MED CNSL INDIV APPRX 45: CPT | Mod: S$GLB,,,

## 2024-02-27 RX ORDER — IBUPROFEN 200 MG
1 TABLET ORAL DAILY
Qty: 14 PATCH | Refills: 0 | Status: SHIPPED | OUTPATIENT
Start: 2024-02-27 | End: 2024-04-02 | Stop reason: SDUPTHER

## 2024-02-27 RX ORDER — MICONAZOLE NITRATE 2 %
2 CREAM (GRAM) TOPICAL
Qty: 100 EACH | Refills: 0 | Status: SHIPPED | OUTPATIENT
Start: 2024-02-27 | End: 2024-03-18 | Stop reason: SDUPTHER

## 2024-02-27 NOTE — PROGRESS NOTES
Individual Follow-Up Form    2/27/2024    Quit Date: TBD    Clinical Status of Patient: Outpatient    Length of Service: 45 minutes    Continuing Medication: yes  Patches or Nicotine gum    Other Medications: none     Target Symptoms: Withdrawal and medication side effects. The following were  rated moderate (3) to severe (4) on TCRS:  Moderate (3): none  Severe (4): none    Comments: Pt smoked yesterday but is having 5 days smoke free often and then smokes again.  She said the gum helps her stay committed to being wuit during those 5 days, but then finds someone who smokes.  She is struggling with not smoking even though they taste bad. She is hoping she can give me a smoke free report next session.  She notes that coloring books and calendars are helping her.  The patient will continue with  therapy sessions and medication monitoring by CTTS. Prescribed medication management will be by physician.  Completion of TCRS (Tobacco Cessation Rating Scale) reviewed strategies, habitual behavior, stress, and high risk situations. Introduced stress with addition interventions, SOLVE, relaxation with interventions, nutrition, exercise, weight gain, and the importance of rewarding oneself for accomplishments toward becoming tobacco free. Open discussion of all items with interventions.   The patient denies any abnormal behavioral or mental changes at this time.       Diagnosis: F17.200    Next Visit: 2 weeks

## 2024-03-18 ENCOUNTER — CLINICAL SUPPORT (OUTPATIENT)
Dept: SMOKING CESSATION | Facility: CLINIC | Age: 59
End: 2024-03-18
Payer: COMMERCIAL

## 2024-03-18 DIAGNOSIS — F17.200 NICOTINE DEPENDENCE: ICD-10-CM

## 2024-03-18 PROCEDURE — 99402 PREV MED CNSL INDIV APPRX 30: CPT | Mod: S$GLB,,,

## 2024-03-18 RX ORDER — MICONAZOLE NITRATE 2 %
2 CREAM (GRAM) TOPICAL
Qty: 220 EACH | Refills: 0 | Status: SHIPPED | OUTPATIENT
Start: 2024-03-18 | End: 2024-04-02 | Stop reason: SDUPTHER

## 2024-03-18 NOTE — PROGRESS NOTES
Individual Follow-Up Form    3/18/2024    Quit Date: TBD    Clinical Status of Patient: Outpatient    Length of Service: 30 minutes    Continuing Medication: yes  Patches or Nicotine gum    Other Medications: none     Target Symptoms: Withdrawal and medication side effects. The following were  rated moderate (3) to severe (4) on TCRS:  Moderate (3): none  Severe (4): none    Comments: Pt smoked 1 cpd in the last 2 weeks when she had some alcohol.  She feels good about how far she has come and to be more mindful about not smoking if having drinks.  She is feeling the freedom from not smoking daily and is happy to be breathing better.  The patient will continue with  therapy sessions and medication monitoring by CTTS. Prescribed medication management will be by physician.  Completion of TCRS (Tobacco Cessation Rating Scale) reviewed strategies, habitual behavior, high risks situations, understanding urges and cravings, stress and relaxation with open discussion and additional interventions, Introduced lapses, relapses, understanding them and analyzing the situation of a lapse, conflict issues that may be linked to a lapse.   The patient denies any abnormal behavioral or mental changes at this time.       Diagnosis: F17.200    Next Visit: 2 weeks

## 2024-04-02 ENCOUNTER — CLINICAL SUPPORT (OUTPATIENT)
Dept: SMOKING CESSATION | Facility: CLINIC | Age: 59
End: 2024-04-02
Payer: COMMERCIAL

## 2024-04-02 DIAGNOSIS — F17.200 NICOTINE DEPENDENCE: ICD-10-CM

## 2024-04-02 PROCEDURE — 99402 PREV MED CNSL INDIV APPRX 30: CPT | Mod: S$GLB,,,

## 2024-04-02 RX ORDER — MICONAZOLE NITRATE 2 %
2 CREAM (GRAM) TOPICAL
Qty: 100 EACH | Refills: 0 | Status: SHIPPED | OUTPATIENT
Start: 2024-04-02 | End: 2024-04-29 | Stop reason: SDUPTHER

## 2024-04-02 RX ORDER — IBUPROFEN 200 MG
1 TABLET ORAL DAILY
Qty: 14 PATCH | Refills: 0 | Status: SHIPPED | OUTPATIENT
Start: 2024-04-02 | End: 2024-04-29 | Stop reason: SDUPTHER

## 2024-04-02 NOTE — PROGRESS NOTES
Individual Follow-Up Form    4/2/2024    Quit Date: TBD    Clinical Status of Patient: Outpatient    Length of Service: 30 minutes    Continuing Medication: yes  Patches or Nicotine gum    Other Medications: none     Target Symptoms: Withdrawal and medication side effects. The following were  rated moderate (3) to severe (4) on TCRS:  Moderate (3): none  Severe (4): none    Comments: Pt smoked 1 cigarette over 2 week period and it was when she was drinking alcohol.  I advised her against do both of these at the same time.  She is positive that she can make it further than 2 weeks next time without smoking.  She is confident and feels good about becoming a nonsmoker.    The patient will continue with  therapy sessions and medication monitoring by CTTS. Prescribed medication management will be by physician.  completion of TCRS (Tobacco Cessation Rating Scale) reviewed strategies, cues, triggers, high risk situations, lapses, relapses, diet, exercise, stress, relaxation, sleep, habitual behavior, and life style changes.  The patient denies any abnormal behavioral or mental changes at this time.       Diagnosis: F17.200    Next Visit: 2 weeks

## 2024-04-11 ENCOUNTER — TELEPHONE (OUTPATIENT)
Dept: SMOKING CESSATION | Facility: CLINIC | Age: 59
End: 2024-04-11
Payer: COMMERCIAL

## 2024-04-29 DIAGNOSIS — F17.200 NICOTINE DEPENDENCE: ICD-10-CM

## 2024-04-29 RX ORDER — MICONAZOLE NITRATE 2 %
2 CREAM (GRAM) TOPICAL
Qty: 100 EACH | Refills: 0 | Status: SHIPPED | OUTPATIENT
Start: 2024-04-29 | End: 2024-05-13 | Stop reason: SDUPTHER

## 2024-04-29 RX ORDER — IBUPROFEN 200 MG
1 TABLET ORAL DAILY
Qty: 14 PATCH | Refills: 0 | Status: SHIPPED | OUTPATIENT
Start: 2024-04-29 | End: 2024-05-25 | Stop reason: SDUPTHER

## 2024-05-13 DIAGNOSIS — F17.200 NICOTINE DEPENDENCE: ICD-10-CM

## 2024-05-14 RX ORDER — MICONAZOLE NITRATE 2 %
2 CREAM (GRAM) TOPICAL
Qty: 100 EACH | Refills: 0 | Status: SHIPPED | OUTPATIENT
Start: 2024-05-14 | End: 2024-05-25 | Stop reason: SDUPTHER

## 2024-05-23 DIAGNOSIS — F17.200 NICOTINE DEPENDENCE: ICD-10-CM

## 2024-05-25 RX ORDER — MICONAZOLE NITRATE 2 %
CREAM (GRAM) TOPICAL
Qty: 100 EACH | Refills: 0 | Status: SHIPPED | OUTPATIENT
Start: 2024-05-25

## 2024-05-25 RX ORDER — IBUPROFEN 200 MG
1 TABLET ORAL
Qty: 14 PATCH | Refills: 0 | Status: SHIPPED | OUTPATIENT
Start: 2024-05-25

## 2024-07-05 ENCOUNTER — CLINICAL SUPPORT (OUTPATIENT)
Dept: SMOKING CESSATION | Facility: CLINIC | Age: 59
End: 2024-07-05
Payer: COMMERCIAL

## 2024-07-05 DIAGNOSIS — F17.200 NICOTINE DEPENDENCE: Primary | ICD-10-CM

## 2024-07-05 PROCEDURE — 99999 PR PBB SHADOW E&M-EST. PATIENT-LVL I: CPT | Mod: PBBFAC,,,

## 2024-07-05 NOTE — PROGRESS NOTES
Spoke with patient today in regard to smoking cessation progress for 6-month telephone follow up.  Patient states that she is tobacco free.  Patient states she is still using NRT gum after meals and when driving for urges.  Patient states she will attempt to wean herself from NRT gum.  Commended patient on their quit.  [facility] very [facility] Te[facility] And[facility].  Informed patient of benefit period, future follow up, and contact information if any further help or support is needed.  Will complete smart form for 3/6 month follow up on Quit attempt #2.

## 2024-07-12 PROBLEM — H73.21 MYRINGITIS OF RIGHT EAR: Status: ACTIVE | Noted: 2024-07-12

## 2025-01-14 ENCOUNTER — TELEPHONE (OUTPATIENT)
Dept: SMOKING CESSATION | Facility: CLINIC | Age: 60
End: 2025-01-14
Payer: COMMERCIAL

## 2025-01-14 NOTE — TELEPHONE ENCOUNTER
Called for 12 month follow up but number is not accepting calls at this time. Resolved quit episode.

## 2025-05-02 PROBLEM — Z01.818 PRE-OP EVALUATION: Status: ACTIVE | Noted: 2025-05-02
